# Patient Record
Sex: MALE | Race: WHITE | NOT HISPANIC OR LATINO | URBAN - METROPOLITAN AREA
[De-identification: names, ages, dates, MRNs, and addresses within clinical notes are randomized per-mention and may not be internally consistent; named-entity substitution may affect disease eponyms.]

---

## 2021-06-03 ENCOUNTER — EMERGENCY (EMERGENCY)
Facility: HOSPITAL | Age: 61
LOS: 1 days | Discharge: HOME | End: 2021-06-03
Attending: EMERGENCY MEDICINE | Admitting: EMERGENCY MEDICINE
Payer: MEDICARE

## 2021-06-03 VITALS
HEART RATE: 68 BPM | OXYGEN SATURATION: 96 % | TEMPERATURE: 98 F | SYSTOLIC BLOOD PRESSURE: 93 MMHG | HEIGHT: 60 IN | RESPIRATION RATE: 16 BRPM | DIASTOLIC BLOOD PRESSURE: 66 MMHG | WEIGHT: 115.08 LBS

## 2021-06-03 VITALS
OXYGEN SATURATION: 99 % | DIASTOLIC BLOOD PRESSURE: 70 MMHG | RESPIRATION RATE: 16 BRPM | TEMPERATURE: 98 F | HEART RATE: 61 BPM | SYSTOLIC BLOOD PRESSURE: 113 MMHG

## 2021-06-03 PROCEDURE — G1004: CPT

## 2021-06-03 PROCEDURE — 70450 CT HEAD/BRAIN W/O DYE: CPT

## 2021-06-03 PROCEDURE — 70486 CT MAXILLOFACIAL W/O DYE: CPT

## 2021-06-03 PROCEDURE — 99284 EMERGENCY DEPT VISIT MOD MDM: CPT | Mod: 25

## 2021-06-03 PROCEDURE — 99284 EMERGENCY DEPT VISIT MOD MDM: CPT

## 2021-06-03 PROCEDURE — 70450 CT HEAD/BRAIN W/O DYE: CPT | Mod: 26,ME

## 2021-06-03 PROCEDURE — 70486 CT MAXILLOFACIAL W/O DYE: CPT | Mod: 26,MG

## 2021-06-03 RX ORDER — LAMOTRIGINE 25 MG/1
1 TABLET, ORALLY DISINTEGRATING ORAL
Qty: 0 | Refills: 0 | DISCHARGE

## 2021-06-03 RX ORDER — DONEPEZIL HYDROCHLORIDE 10 MG/1
1 TABLET, FILM COATED ORAL
Qty: 0 | Refills: 0 | DISCHARGE

## 2021-06-03 RX ORDER — LOVASTATIN 20 MG
1 TABLET ORAL
Qty: 0 | Refills: 0 | DISCHARGE

## 2021-06-03 NOTE — ED ADULT NURSE NOTE - NSIMPLEMENTINTERV_GEN_ALL_ED
Implemented All Fall Risk Interventions:  [place] to call system. Call bell, personal items and telephone within reach. Instruct patient to call for assistance. Room bathroom lighting operational. Non-slip footwear when patient is off stretcher. Physically safe environment: no spills, clutter or unnecessary equipment. Stretcher in lowest position, wheels locked, appropriate side rails in place. Provide visual cue, wrist band, yellow gown, etc. Monitor gait and stability. Monitor for mental status changes and reorient to person, place, and time. Review medications for side effects contributing to fall risk. Reinforce activity limits and safety measures with patient and family.

## 2021-06-03 NOTE — ED PROVIDER NOTE - NSFOLLOWUPINSTRUCTIONS_ED_ALL_ED_FT
[employer]® CareNotes®     :  [facility]  	          HEAD INJURY - AfterCare(R) Instructions(ER/ED)     Head Injury    WHAT YOU NEED TO KNOW:    A head injury can include your scalp, face, skull, or brain and range from mild to severe. Effects can appear immediately after the injury or develop later. The effects may last a short time or be permanent. Healthcare providers may want to check your recovery over time. Treatment may change as you recover or develop new health problems from the head injury.    DISCHARGE INSTRUCTIONS:    Call your local emergency number (911 in the [place]), or have someone else call if:   •You cannot be woken.    •You have a seizure.    •You stop responding to others or you faint.    •You have blurry or double vision.    •Your speech becomes slurred or confused.    •You have arm or leg weakness, loss of feeling, or new problems with coordination.    •Your pupils are larger than usual, or one pupil is a different size than the other.    •You have blood or clear fluid coming out of your ears or nose.    Return to the emergency department if:   •You have repeated or forceful vomiting.    •You feel confused.    •Your headache gets worse or becomes severe.    •You or someone caring for you notices that you are harder to wake than usual.    Call your doctor if:   •Your symptoms last longer than 6 weeks after the injury.    •You have questions or concerns about your condition or care.    Medicines:   •Acetaminophen decreases pain and fever. It is available without a doctor's order. Ask how much to take and how often to take it. Follow directions. Read the labels of all other medicines you are using to see if they also contain acetaminophen, or ask your doctor or pharmacist. Acetaminophen can cause liver damage if not taken correctly. Do not use more than 4 grams (4,000 milligrams) total of acetaminophen in one day.     •Take your medicine as directed. Contact your healthcare provider if you think your medicine is not helping or if you have side effects. Tell him or her if you are allergic to any medicine. Keep a list of the medicines, vitamins, and herbs you take. Include the amounts, and when and why you take them. Bring the list or the pill bottles to follow-up visits. Carry your medicine list with you in case of an emergency.    Self-care:   •Rest or do quiet activities. Limit your time watching TV, using the computer, or doing tasks that require a lot of thinking. Slowly return to your normal activities as directed. Do not play sports or do activities that may cause you to get hit in the head. Ask your healthcare provider when you can return to sports.    •Apply ice on your head for 15 to 20 minutes every hour or as directed. Use an ice pack, or put crushed ice in a plastic bag. Cover it with a towel before you apply it to your skin. Ice helps prevent tissue damage and decreases swelling and pain.    •Have someone stay with you for 24 hours , or as directed. This person can monitor you for problems and call for help if needed. When you are awake, the person should ask you a few questions every few hours to see if you are thinking clearly. An example is to ask your name or address.    Prevent another head injury:   •Wear a helmet that fits properly. Do this when you play sports, or ride a bike, scooter, or skateboard. Helmets help decrease your risk for a serious head injury. Talk to your healthcare provider about other ways you can protect yourself if you play sports.    •Wear your seatbelt every time you are in a car. This helps lower your risk for a head injury if you are in a car accident.    Follow up with your doctor as directed: Write down your questions so you remember to ask them during your visits.     © Copyright [employer] 2021         Apply cold packs 10 minutes on/10 minutes off  Follow up with your doctor tomorrow.    Please return to the Emergency Department immediately for any problems or concerns. [employer]® CareNotes®     :  [facility]  	      HEAD INJURY - AfterCare(R) Instructions(ER/ED)     Head Injury    WHAT YOU NEED TO KNOW:    A head injury can include your scalp, face, skull, or brain and range from mild to severe. Effects can appear immediately after the injury or develop later. The effects may last a short time or be permanent. Healthcare providers may want to check your recovery over time. Treatment may change as you recover or develop new health problems from the head injury.    DISCHARGE INSTRUCTIONS:    Call your local emergency number (911 in the [place]), or have someone else call if:   •You cannot be woken.    •You have a seizure.    •You stop responding to others or you faint.    •You have blurry or double vision.    •Your speech becomes slurred or confused.    •You have arm or leg weakness, loss of feeling, or new problems with coordination.    •Your pupils are larger than usual, or one pupil is a different size than the other.    •You have blood or clear fluid coming out of your ears or nose.    Return to the emergency department if:   •You have repeated or forceful vomiting.    •You feel confused.    •Your headache gets worse or becomes severe.    •You or someone caring for you notices that you are harder to wake than usual.    Call your doctor if:   •Your symptoms last longer than 6 weeks after the injury.    •You have questions or concerns about your condition or care.    Medicines:   •Acetaminophen decreases pain and fever. It is available without a doctor's order. Ask how much to take and how often to take it. Follow directions. Read the labels of all other medicines you are using to see if they also contain acetaminophen, or ask your doctor or pharmacist. Acetaminophen can cause liver damage if not taken correctly. Do not use more than 4 grams (4,000 milligrams) total of acetaminophen in one day.     •Take your medicine as directed. Contact your healthcare provider if you think your medicine is not helping or if you have side effects. Tell him or her if you are allergic to any medicine. Keep a list of the medicines, vitamins, and herbs you take. Include the amounts, and when and why you take them. Bring the list or the pill bottles to follow-up visits. Carry your medicine list with you in case of an emergency.    Self-care:   •Rest or do quiet activities. Limit your time watching TV, using the computer, or doing tasks that require a lot of thinking. Slowly return to your normal activities as directed. Do not play sports or do activities that may cause you to get hit in the head. Ask your healthcare provider when you can return to sports.    •Apply ice on your head for 15 to 20 minutes every hour or as directed. Use an ice pack, or put crushed ice in a plastic bag. Cover it with a towel before you apply it to your skin. Ice helps prevent tissue damage and decreases swelling and pain.    •Have someone stay with you for 24 hours , or as directed. This person can monitor you for problems and call for help if needed. When you are awake, the person should ask you a few questions every few hours to see if you are thinking clearly. An example is to ask your name or address.    Prevent another head injury:   •Wear a helmet that fits properly. Do this when you play sports, or ride a bike, scooter, or skateboard. Helmets help decrease your risk for a serious head injury. Talk to your healthcare provider about other ways you can protect yourself if you play sports.    •Wear your seatbelt every time you are in a car. This helps lower your risk for a head injury if you are in a car accident.    Follow up with your doctor as directed: Write down your questions so you remember to ask them during your visits.     © Copyright [employer] 2021         Apply cold packs 10 minutes on/10 minutes off  Follow up with your doctor tomorrow.    Please return to the Emergency Department immediately for any problems or concerns.

## 2021-06-03 NOTE — ED PROVIDER NOTE - OBJECTIVE STATEMENT
62yo M with dementia and downs syndrome with sister c/o slip and fall last night at approx 9pm getting out of shower and hitting left periorbital area Unknown on what. sister st pt in [facility] no changes, only ecchymosis to left eye area. pt st no c/o.  no headache, no neck pain back pain, cp, sob, abd pain, n/v/d, fever, chills, dizziness, numbness, tingling, weakness. no changes in vison/gait or speech.  tet=utd  pmd= salabatin

## 2021-06-03 NOTE — ED ADULT NURSE NOTE - OBJECTIVE STATEMENT
As per Sister, pt fell down and hit left facial/eye on something, ecchymosis to left eye noted, no loc. Hx Down's/ Dementia.
None known

## 2021-06-03 NOTE — ED PROVIDER NOTE - PATIENT PORTAL LINK FT
You can access the FollowMyHealth Patient Portal offered by [facility] by registering at the following website: [email or web address]. By joining [email or web address]’s FollowMyHealth portal, you will also be able to view your health information using other applications (apps) compatible with our system.

## 2021-06-03 NOTE — ED PROVIDER NOTE - CARE PROVIDER_API CALL
Tadeo Farley (DO)  Family Medicine  [address] [place] [address]  Phone: [phone]  Fax: [phone]  Established Patient  Follow Up Time: 1-3 Days

## 2021-06-03 NOTE — ED PROVIDER NOTE - CLINICAL SUMMARY MEDICAL DECISION MAKING FREE TEXT BOX
60yo M with dementia and downs presents with sister s/p trip and fall last night at home getting out of shower now with left orbital ecchymosis, CT Head, CT Maxillofacial, Facial Fracture, ICH

## 2021-06-03 NOTE — ED PROVIDER NOTE - PHYSICAL EXAMINATION
Gen: Awake, Alert, WD, WN, NAD  Head:  NC, + left periorbital ecchymosis inferior aspect with superficial linear abrasion. +hemostasis, no crepitus  Eyes:  PERRL, EOMI, no entrapement, Conjunctiva pink, lids normal, no scleral icterus  ENT: OP clear, moist mucus membranes  Neck: supple, nontender, trachea midline  Cardiac/CV:  S1 S2, RRR, no M/G/R  Respiratory/Pulm:  CTAB, good air movement, normal resp effort, no wheezes/stridor/retractions/rales/rhonchi  Gastrointestinal/Abdomen:  Soft, nontender, nondistended, +BS, no rebound/guarding  Pelvis: stable, non tender  Back:  no CVAT, no MLT  Ext:  warm, well perfused, moving all extremities spontaneously, no peripheral edema, distal pulses intact  Skin: intact, no rash  Neuro:  AAOx2 (baseline as per sister), sensation intact, motor 5/5 x 4 extremities, speech clear

## 2021-06-03 NOTE — ED PROVIDER NOTE - CARE PLAN
Principal Discharge DX:	Facial trauma, initial encounter  Secondary Diagnosis:	Down's syndrome  Secondary Diagnosis:	Dementia

## 2023-07-28 PROBLEM — Z00.00 ENCOUNTER FOR PREVENTIVE HEALTH EXAMINATION: Status: ACTIVE | Noted: 2023-07-28

## 2023-11-02 PROBLEM — Q90.9 DOWN SYNDROME, UNSPECIFIED: Chronic | Status: ACTIVE | Noted: 2021-06-03

## 2023-11-02 PROBLEM — G40.909 EPILEPSY, UNSPECIFIED, NOT INTRACTABLE, WITHOUT STATUS EPILEPTICUS: Chronic | Status: ACTIVE | Noted: 2021-06-03

## 2023-11-02 PROBLEM — F03.90 UNSPECIFIED DEMENTIA, UNSPECIFIED SEVERITY, WITHOUT BEHAVIORAL DISTURBANCE, PSYCHOTIC DISTURBANCE, MOOD DISTURBANCE, AND ANXIETY: Chronic | Status: ACTIVE | Noted: 2021-06-03

## 2023-11-09 ENCOUNTER — APPOINTMENT (OUTPATIENT)
Dept: ORTHOPEDIC SURGERY | Facility: CLINIC | Age: 63
End: 2023-11-09
Payer: MEDICARE

## 2023-11-09 VITALS — BODY MASS INDEX: 15.12 KG/M2 | WEIGHT: 77 LBS | HEIGHT: 60 IN

## 2023-11-09 DIAGNOSIS — Z87.39 PERSONAL HISTORY OF OTHER DISEASES OF THE MUSCULOSKELETAL SYSTEM AND CONNECTIVE TISSUE: ICD-10-CM

## 2023-11-09 DIAGNOSIS — Z86.2 PERSONAL HISTORY OF DISEASES OF THE BLOOD AND BLOOD-FORMING ORGANS AND CERTAIN DISORDERS INVOLVING THE IMMUNE MECHANISM: ICD-10-CM

## 2023-11-09 DIAGNOSIS — M50.30 OTHER CERVICAL DISC DEGENERATION, UNSPECIFIED CERVICAL REGION: ICD-10-CM

## 2023-11-09 DIAGNOSIS — M49.86: ICD-10-CM

## 2023-11-09 DIAGNOSIS — M25.572 PAIN IN LEFT ANKLE AND JOINTS OF LEFT FOOT: ICD-10-CM

## 2023-11-09 DIAGNOSIS — Q90.9 DOWN SYNDROME, UNSPECIFIED: ICD-10-CM

## 2023-11-09 DIAGNOSIS — Z86.39 PERSONAL HISTORY OF OTHER ENDOCRINE, NUTRITIONAL AND METABOLIC DISEASE: ICD-10-CM

## 2023-11-09 DIAGNOSIS — M24.562 CONTRACTURE, LEFT KNEE: ICD-10-CM

## 2023-11-09 PROCEDURE — 73610 X-RAY EXAM OF ANKLE: CPT | Mod: LT

## 2023-11-09 PROCEDURE — 99204 OFFICE O/P NEW MOD 45 MIN: CPT

## 2023-11-09 PROCEDURE — 72170 X-RAY EXAM OF PELVIS: CPT

## 2023-11-09 PROCEDURE — 72100 X-RAY EXAM L-S SPINE 2/3 VWS: CPT

## 2023-11-09 RX ORDER — LAMOTRIGINE 100 MG/1
100 TABLET ORAL
Refills: 0 | Status: ACTIVE | COMMUNITY

## 2023-11-09 RX ORDER — DOCUSATE SODIUM 100 MG
100 TABLET ORAL
Refills: 0 | Status: ACTIVE | COMMUNITY

## 2023-11-09 RX ORDER — TAMSULOSIN HYDROCHLORIDE 0.4 MG/1
0.4 CAPSULE ORAL
Refills: 0 | Status: ACTIVE | COMMUNITY

## 2023-11-09 RX ORDER — LOVASTATIN 10 MG/1
10 TABLET ORAL
Refills: 0 | Status: ACTIVE | COMMUNITY

## 2023-11-09 RX ORDER — HYDROCORTISONE 10 MG/G
1 CREAM TOPICAL
Refills: 0 | Status: ACTIVE | COMMUNITY

## 2023-11-09 RX ORDER — DONEPEZIL HYDROCHLORIDE 10 MG/1
10 TABLET, FILM COATED ORAL
Refills: 0 | Status: ACTIVE | COMMUNITY

## 2023-11-09 RX ORDER — AMMONIUM LACTATE 12 %
12 LOTION (GRAM) TOPICAL
Refills: 0 | Status: ACTIVE | COMMUNITY

## 2023-11-09 RX ORDER — CHOLECALCIFEROL (VITAMIN D3)
CRYSTALS MISCELLANEOUS
Refills: 0 | Status: ACTIVE | COMMUNITY

## 2023-11-09 RX ORDER — MEMANTINE HYDROCHLORIDE 10 MG/1
10 TABLET ORAL
Refills: 0 | Status: ACTIVE | COMMUNITY

## 2023-11-09 RX ORDER — FOLIC ACID AND CYANOCOBALAMIN 1; 500 MG/1; UG/1
500-1 TABLET, FILM COATED ORAL
Refills: 0 | Status: ACTIVE | COMMUNITY

## 2023-11-09 RX ORDER — SENNOSIDES 8.6 MG TABLETS 8.6 MG/1
8.6 TABLET ORAL
Refills: 0 | Status: ACTIVE | COMMUNITY

## 2023-11-09 RX ORDER — ASPIRIN 81 MG/1
81 TABLET, CHEWABLE ORAL
Refills: 0 | Status: ACTIVE | COMMUNITY

## 2024-02-28 NOTE — ED PROVIDER NOTE - CROS ED RESP ALL NEG
Spoke to patient and assisted with rescheduled appointment for 4/10/24 at 1030 with Dr. Vu at the [place] location.   negative...